# Patient Record
(demographics unavailable — no encounter records)

---

## 2024-11-02 NOTE — ADDENDUM
[FreeTextEntry1] : I, Bharat Caldera, hereby attest that the medical record entry for this patient accurately reflects signatures/notations that I made on the Date of Service in my capacity as an Attending Physician when I treated/diagnosed the above patient. I do hereby attest that this information is true, accurate and complete to the best of my knowledge and I understand that any falsification, omission, or concealment of material fact may subject me to administrative, civil, or, criminal liability. I agree with the note as written by my PA in its entirety. I was present for the entire visit and supervised the entire visit and agree with the plan as outlined.  I, Ernst Cox, am scribing for and the presence of Dr. Caldera the following sections: HPI, PMH,Family/social history, ROS, Physical Exam, Assessment / Plan.

## 2024-11-02 NOTE — DISCUSSION/SUMMARY
[FreeTextEntry1] : 22 year old male with SVT, who presents for follow up evaluation.  We again discussed the normal conduction system of the heart and the various types of SVT.  Options for management of his arrhythmia, including observation, vagal maneuvers, medical therapy and catheter based ablation were discussed in detail. We discussed risks of ablation including, but not limited to, infection, vascular injury, cardiac perforation, injury to intrinsic conduction system necessitating PPM placement or need for emergent surgery.  He continues to want to proceed with observation.  He has PRN Metoprolol at home.  Structurally normal heart on echo.     He will follow with us in 6 months or sooner if needed.  He knows to call with any questions or concerns.    [EKG obtained to assist in diagnosis and management of assessed problem(s)] : EKG obtained to assist in diagnosis and management of assessed problem(s)

## 2024-11-02 NOTE — HISTORY OF PRESENT ILLNESS
[FreeTextEntry1] : 22 year old male with SVT, who presents for follow up evaluation.  He states he was in his usual state of health until 9/2024 (with no changes in his daily routine / how he was feeling) when he woke up from bed with palpitations and chest tightness.  He called 911. His uncle had a [employer] device and recorded his EKG which showed a short RP tachycardia at 220bpm.  He got adenosine in the field with termination of the tachycardia.  He states he maybe had an episode similar to this a few years ago but it wasn't as significant.  He is active with no limitations.  No SOB, exertional chest pain, syncope, near syncope or edema.  He has had an echo today.  SInce his last visit no further palpitations.  He has been abstaining from marijuana as he thinks he may have been a trigger  His ECG shows sinus rhythm with no evidence of pre-excitation and an rsr' pattern in V1 and V2 with a normal QRS duration.    ECho 10/2024  1. Borderline normal left ventricular systolic function, LVEF 50-55%.  2. Normal right ventricular size and systolic function.  3. No significant valvular disease.  4. No evidence of pulmonary hypertension.  5. No pericardial effusion.  6. No prior echo is available for comparison.

## 2024-11-02 NOTE — PHYSICAL EXAM
[Well Developed] : well developed [Well Nourished] : well nourished [No Acute Distress] : no acute distress [Normal Conjunctiva] : normal conjunctiva [5th Left ICS - MCL] : palpated at the 5th LICS in the midclavicular line [Normal Rate] : normal [Rhythm Regular] : regular [Normal S1] : normal S1 [Normal S2] : normal S2 [Clear Lung Fields] : clear lung fields [Good Air Entry] : good air entry [No Respiratory Distress] : no respiratory distress  [Soft] : abdomen soft [Normal Gait] : normal gait [No Edema] : no edema [No Rash] : no rash [Moves all extremities] : moves all extremities [Alert and Oriented] : alert and oriented [Click] : no click [Normal Speech] : normal speech

## 2024-11-02 NOTE — HISTORY OF PRESENT ILLNESS
[FreeTextEntry1] : 22 year old male with SVT, who presents for follow up evaluation.  He states he was in his usual state of health until 9/2024 (with no changes in his daily routine / how he was feeling) when he woke up from bed with palpitations and chest tightness.  He called 911. His uncle had a [employer] device and recorded his EKG which showed a short RP tachycardia at 220bpm.  He got adenosine in the field with termination of the tachycardia.  He states he maybe had an episode similar to this a few years ago but it wasn't as significant.  He is active with no limitations.  No SOB, exertional chest pain, syncope, near syncope or edema.  He has had an echo today.  SInce his last visit no further palpitations.  He has been abstaining from marijuana as he thinks he may have been a trigger  His ECG shows sinus rhythm with no evidence of pre-excitation and an rsr' pattern in V1 and V2 with a normal QRS duration.    ECho 10/2024  1. Borderline normal left ventricular systolic function, LVEF 50-55%.  2. Normal right ventricular size and systolic function.  3. No significant valvular disease.  4. No evidence of pulmonary hypertension.  5. No pericardial effusion.  6. No prior echo is available for comparison.

## 2024-11-02 NOTE — HISTORY OF PRESENT ILLNESS
[FreeTextEntry1] : 22 year old male with SVT, who presents for follow up evaluation.  He states he was in his usual state of health until 9/2024 (with no changes in his daily routine / how he was feeling) when he woke up from bed with palpitations and chest tightness.  He called 911. His uncle had a [employer] device and recorded his EKG which showed a short RP tachycardia at 220bpm.  He got adenosine in the field with termination of the tachycardia.  He states he maybe had an episode similar to this a few years ago but it wasn't as significant.  He is active with no limitations.  No SOB, exertional chest pain, syncope, near syncope or edema.  He has had an echo today.  SInce his last visit no further palpitations.  He has been abstaining from marijuana as he thinks he may have been a trigger  His ECG shows sinus rhythm with no evidence of pre-excitation and an rsr' pattern in V1 and V2 with a normal QRS duration.    ECho 10/2024  1. Borderline normal left ventricular systolic function, LVEF 50-55%.  2. Normal right ventricular size and systolic function.  3. No significant valvular disease.  4. No evidence of pulmonary hypertension.  5. No pericardial effusion.  6. No prior echo is available for comparison.